# Patient Record
Sex: FEMALE | Race: WHITE | NOT HISPANIC OR LATINO | Employment: UNEMPLOYED | URBAN - METROPOLITAN AREA
[De-identification: names, ages, dates, MRNs, and addresses within clinical notes are randomized per-mention and may not be internally consistent; named-entity substitution may affect disease eponyms.]

---

## 2021-01-19 PROBLEM — R19.7 DIARRHEA: Status: ACTIVE | Noted: 2021-01-19

## 2021-02-10 PROBLEM — L40.0 PLAQUE PSORIASIS: Status: ACTIVE | Noted: 2021-02-10

## 2021-02-10 PROBLEM — E66.9 OBESITY: Status: ACTIVE | Noted: 2021-02-10

## 2021-02-18 ENCOUNTER — SPECIALTY PHARMACY (OUTPATIENT)
Dept: PHARMACY | Facility: CLINIC | Age: 46
End: 2021-02-18

## 2021-02-22 ENCOUNTER — SPECIALTY PHARMACY (OUTPATIENT)
Dept: PHARMACY | Facility: CLINIC | Age: 46
End: 2021-02-22

## 2021-02-22 PROBLEM — R19.7 DIARRHEA: Status: RESOLVED | Noted: 2021-01-19 | Resolved: 2021-02-22

## 2021-02-22 PROBLEM — L40.50 PSORIATIC ARTHRITIS: Status: ACTIVE | Noted: 2021-02-22

## 2021-02-22 PROBLEM — R03.0 ELEVATED BLOOD-PRESSURE READING WITHOUT DIAGNOSIS OF HYPERTENSION: Status: ACTIVE | Noted: 2021-02-22

## 2022-03-24 LAB — CRC RECOMMENDATION EXT: NORMAL

## 2022-05-16 ENCOUNTER — SPECIALTY PHARMACY (OUTPATIENT)
Dept: PHARMACY | Facility: CLINIC | Age: 47
End: 2022-05-16

## 2022-05-16 NOTE — TELEPHONE ENCOUNTER
Incoming call from pt inquiring about if [facility] received humira rx    Notified her that [facility] received. Pt stated that she last had it filled with [facility] but not sure if they would still be able to fill it. Notified her that [facility] can call to see which pharmacy can fill it for her    Will send message to rheum team for urgent work-up and follow-up

## 2022-05-16 NOTE — TELEPHONE ENCOUNTER
LVM for patient to inform her that [facility] can fill for her since she has Medicaid coverage.   PA required. Submitted via CMM   Loading: (Key: [id number])  Maintenance:  (Key: [id number])

## 2022-05-17 NOTE — TELEPHONE ENCOUNTER
DOCUMENTATION ONLY:  Prior authorization for Mena SUBRAMANIAN and MD approved from 5/16/22 to 11/16/22    Case Id:  [id number]    Co-pay: 0.00    NATALIE override needed. Rep- Vanna. NATALIE approved from 5/17/22-5/17/23.     Medicaid, FA not required.

## 2022-05-18 NOTE — TELEPHONE ENCOUNTER
Patient called back regarding restarting Humira. While attempting to set up a shipment, patient stated she is currently staying in [place] indefinitely. Advised patient [facility] is unable to ship to [place] at this time. Patient requested to fill with [facility]. Confirmed [facility] is on patient's preferred list. Messaged provider to send new Rxs to [facility]. Closing referral.

## 2022-07-21 ENCOUNTER — PATIENT OUTREACH (OUTPATIENT)
Dept: ADMINISTRATIVE | Facility: HOSPITAL | Age: 47
End: 2022-07-21

## 2022-12-15 ENCOUNTER — SPECIALTY PHARMACY (OUTPATIENT)
Dept: PHARMACY | Facility: CLINIC | Age: 47
End: 2022-12-15

## 2022-12-15 NOTE — TELEPHONE ENCOUNTER
Incoming call from the pt regarding her Humira. Pt said she was due to inject on 12/14, past due now. Informed her that the PA was submitted and pending a decision. Pt usually fills with [facility] but may want to fill with [facility] this time. Told pt I would let her assigned rp to call her ASAP with insurance decision.

## 2022-12-15 NOTE — TELEPHONE ENCOUNTER
Humira PA submitted via CMM. Key: [id number]  Suzie, this is Ross with [facility].  We are working on your prescription that your doctor has sent us. We will be working with your insurance to get this approved for you. We will be calling you along the way with updates on your medication.  If you have any questions, you can reach us at [phone].    Welcome call outcome: Left voicemail

## 2022-12-16 ENCOUNTER — SPECIALTY PHARMACY (OUTPATIENT)
Dept: PHARMACY | Facility: CLINIC | Age: 47
End: 2022-12-16

## 2022-12-16 DIAGNOSIS — L40.0 PLAQUE PSORIASIS: Primary | ICD-10-CM

## 2022-12-16 NOTE — TELEPHONE ENCOUNTER
Specialty Pharmacy - Initial Clinical Assessment    Specialty Medication Orders Linked to Encounter      Flowsheet Row Most Recent Value   Medication #1 adalimumab (HUMIRA,CF, PEN) 40 mg/0.4 mL PnKt (Order#[id number], Rx#[id number])          Patient Diagnosis   L40.0 - Plaque psoriasis    Jones Meza is a 47 y.o. female, who is followed by the specialty pharmacy service for management and education.    Recent Encounters       Date Type Provider Description    12/16/2022 Specialty Pharmacy Sandy Jackson Initial Clinical Assessment    12/15/2022 Specialty Pharmacy Sandy Jackson Referral Authorization    05/16/2022 Specialty Pharmacy Reyna Coon PharmD Referral Authorization    02/22/2021 Specialty Pharmacy Blanquita Quiñones PharmD Initial Clinical Assessment    02/18/2021 Specialty Pharmacy Dixie Dao PharmD Referral Authorization          Clinical call attempts since last clinical assessment   5/18/2022  4:37 PM - Specialty Pharmacy - Clinical Assessment by Shayy Nagy PharmD     Current Outpatient Medications   Medication Sig    adalimumab (HUMIRA,CF, PEN) 40 mg/0.4 mL PnKt Inject 0.4 mLs (40 mg total) into the skin every 14 (fourteen) days.    hydrOXYchloroQUINE (PLAQUENIL) 200 mg tablet Take 1 tablet (200 mg total) by mouth 2 (two) times daily.    triamcinolone acetonide 0.1% (KENALOG) 0.1 % ointment Apply a thin layer on the affected area as needed. 2 weeks on/2 weeks off   Last reviewed on 12/16/2022  1:31 PM by Manuel Narayan, Sandy    Review of patient's allergies indicates:  No Known AllergiesLast reviewed on  12/16/2022 1:31 PM by Manuel Narayan    Drug Interactions    Drug interactions evaluated: yes  Clinically relevant drug interactions identified: no  Provided the patient with educational material regarding drug interactions: not applicable           Assessment Questions - Documented Responses      Flowsheet Row Most Recent Value   Assessment    Medication  "Reconciliation completed for patient Yes   During the past 4 weeks, has patient missed any activities due to condition or medication? No   During the past 4 weeks, did patient have any of the following urgent care visits? None   Goals of Therapy Status Achieving   Status of the patients ability to self-administer: Is Able   All education points have been covered with patient? No, patient declined- printed education provided   Welcome packet contents reviewed and discussed with patient? Yes   Assesment completed? Yes   Plan Therapy continued   Do you need to open a clinical intervention (i-vent)? No   Do you want to schedule first shipment? Yes          Refill Questions - Documented Responses      Flowsheet Row Most Recent Value   Patient Availability and HIPAA Verification    Does patient want to proceed with activity? Yes   HIPAA/medical authority confirmed? Yes   Relationship to patient of person spoken to? Self   Refill Screening Questions    When does the patient need to receive the medication? 12/20/22   Refill Delivery Questions    How will the patient receive the medication? Mail   When does the patient need to receive the medication? 12/20/22   Shipping Address Home   Address in [facility] confirmed and updated if neccessary? Yes   Expected Copay ($) 0   Is the patient able to afford the medication copay? Yes   Payment Method zero copay   Days supply of Refill 28   Supplies needed? No supplies needed   Refill activity completed? Yes   Refill activity plan Refill scheduled   Shipment/Pickup Date: 12/19/22            Objective    She has a past medical history of Colon polyps, Polycystic ovary syndrome, and Psoriatic arthritis.        BP Readings from Last 4 Encounters:   05/11/22 135/88   04/20/22 129/79   03/24/21 (!) 138/101   02/10/21 (!) 147/79     Ht Readings from Last 4 Encounters:   05/11/22 5' 5" (1.651 m)   04/20/22 5' 5" (1.651 m)   03/24/21 5' 5" (1.651 m)   02/10/21 5' 5" (1.651 m)     Wt " Readings from Last 4 Encounters:   05/11/22 112.7 kg (248 lb 6.4 oz)   04/20/22 114.5 kg (252 lb 6.4 oz)   03/24/21 108.9 kg (240 lb)   02/10/21 108.7 kg (239 lb 11.2 oz)     No results for input(s): CREATININE, ALT, AST, HEPBSAG, HEPBSAB, HEPBCAB in the last 2160 hours.  The goals of Psoriasis treatment include:  Reducing the size, thickness and extent of lesions and erythema  Relieving the symptoms of psoriasis  Improving and maintaining physical function  Preventing infection and other complications of treatment  Reducing long term complications of psoriasis  Minimizing psychological impact on overall well-being  Improving or maintaining quality of life  Maintaining optimal therapy adherence  Minimizing and managing side effects    Goals of Therapy Status: Achieving    Assessment/Plan  Patient plans to continue therapy without changes      Indication, dosage, appropriateness, effectiveness, safety and convenience of her specialty medication(s) were reviewed today.     Patient Education   Pharmacist offer to  patient was declined. Printed educational materials will be provided with medication.  Patient did accept verbal education on the following topics:    Pt declined initial consult and clinical services. She has been on therapy for 6 years.     Tasks added this encounter   No tasks added.   Tasks due within next 3 months   12/16/2022 - Clinical - Initial Assessment     S[facility] - Specialty Pharmacy  [address]  [facility] [address]  Phone: [phone]  Fax: [phone]

## 2022-12-16 NOTE — TELEPHONE ENCOUNTER
Humira PA approved. Request Reference Number: [id number]. KAYLA PEN INJ 40/0.4ML is approved through 12/15/2023. For further questions, call [facility] at [phone].  Medicaid coverage. BI and FA not required.

## 2023-01-10 ENCOUNTER — SPECIALTY PHARMACY (OUTPATIENT)
Dept: PHARMACY | Facility: CLINIC | Age: 48
End: 2023-01-10

## 2023-01-10 NOTE — TELEPHONE ENCOUNTER
Incoming call from pt regarding Humira.  Pt gave new insurance information.  Insurance entered to into [facility].  Claim rejecting for PA required.  Informed pt will notify assigned pharmacist to work on urgent PA.  Pt voiced understanding.  Pt stated she is due to inject on 1/17.  Routing assigned pharmacist to work on PA.

## 2023-01-10 NOTE — TELEPHONE ENCOUNTER
Outgoing call: patient is due to inject on 1/17, insurance is rejecting saying not covered. Patient will follow up with [facility] once she speak with insurance. Will follow up.

## 2023-01-13 NOTE — TELEPHONE ENCOUNTER
Informed patient to inject when arrives and return to normal calendar, patient expressed understanding.

## 2023-01-13 NOTE — TELEPHONE ENCOUNTER
Specialty Pharmacy - Refill Coordination    Specialty Medication Orders Linked to Encounter      Flowsheet Row Most Recent Value   Medication #1 adalimumab (HUMIRA,CF, PEN) 40 mg/0.4 mL PnKt (Order#[id number], Rx#[id number])            Refill Questions - Documented Responses      Flowsheet Row Most Recent Value   Patient Availability and HIPAA Verification    Does patient want to proceed with activity? Yes   HIPAA/medical authority confirmed? Yes   Relationship to patient of person spoken to? Self   Refill Screening Questions    Changes to allergies? No   Changes to medications? No   New conditions since last clinic visit? No   Unplanned office visit, urgent care, ED, or hospital admission in the last 4 weeks? No   How does patient/caregiver feel medication is working? Good   Financial problems or insurance changes? No   How many doses of your specialty medications were missed in the last 4 weeks? 0   Would patient like to speak to a pharmacist? No   When does the patient need to receive the medication? 01/17/23   Refill Delivery Questions    How will the patient receive the medication? [facility]   When does the patient need to receive the medication? 01/17/23   Shipping Address Home   Address in [facility] confirmed and updated if neccessary? Yes   Expected Copay ($) 0   Is the patient able to afford the medication copay? Yes   Payment Method zero copay   Days supply of Refill 28   Supplies needed? No supplies needed   Refill activity completed? Yes   Refill activity plan Refill scheduled   Shipment/Pickup Date: 01/17/23            Current Outpatient Medications   Medication Sig    adalimumab (HUMIRA,CF, PEN) 40 mg/0.4 mL PnKt Inject 0.4 mLs (40 mg total) into the skin every 14 (fourteen) days.    hydrOXYchloroQUINE (PLAQUENIL) 200 mg tablet Take 1 tablet (200 mg total) by mouth 2 (two) times daily. (Patient not taking: Reported on 1/10/2023)    ketoconazole (NIZORAL) 2 % shampoo Apply topically twice a week.     triamcinolone acetonide 0.1% (KENALOG) 0.1 % ointment Apply a thin layer on the affected area as needed. 2 weeks on/2 weeks off   Last reviewed on 1/11/2023  3:14 PM by Kathy Hilario MD    Review of patient's allergies indicates:  No Known Allergies Last reviewed on  1/11/2023 3:15 PM by Kathy Hilario      Tasks added this encounter   2/7/2023 - Refill Call (Auto Added)   Tasks due within next 3 months   No tasks due.     Carrie Mahoney [facility] - Specialty Pharmacy  [address] [facility] [address]  Phone: [phone]  Fax: [phone]

## 2023-02-01 DIAGNOSIS — Z12.31 OTHER SCREENING MAMMOGRAM: ICD-10-CM

## 2023-02-03 ENCOUNTER — PATIENT MESSAGE (OUTPATIENT)
Dept: ADMINISTRATIVE | Facility: HOSPITAL | Age: 48
End: 2023-02-03

## 2023-02-07 ENCOUNTER — SPECIALTY PHARMACY (OUTPATIENT)
Dept: PHARMACY | Facility: CLINIC | Age: 48
End: 2023-02-07

## 2023-02-24 ENCOUNTER — PATIENT MESSAGE (OUTPATIENT)
Dept: RESEARCH | Facility: HOSPITAL | Age: 48
End: 2023-02-24

## 2023-03-07 ENCOUNTER — SPECIALTY PHARMACY (OUTPATIENT)
Dept: PHARMACY | Facility: CLINIC | Age: 48
End: 2023-03-07

## 2023-03-07 NOTE — TELEPHONE ENCOUNTER
Specialty Pharmacy - Refill Coordination    Specialty Medication Orders Linked to Encounter      Flowsheet Row Most Recent Value   Medication #1 adalimumab (HUMIRA,CF, PEN) 40 mg/0.4 mL PnKt (Order#[id number], Rx#[id number])            Refill Questions - Documented Responses      Flowsheet Row Most Recent Value   Patient Availability and HIPAA Verification    Does patient want to proceed with activity? Yes   HIPAA/medical authority confirmed? Yes   Relationship to patient of person spoken to? Self   Refill Screening Questions    Changes to allergies? No   Changes to medications? No   New conditions since last clinic visit? No   Unplanned office visit, urgent care, ED, or hospital admission in the last 4 weeks? No   How does patient/caregiver feel medication is working? Very good   Financial problems or insurance changes? No   How many doses of your specialty medications were missed in the last 4 weeks? 0   Would patient like to speak to a pharmacist? No   When does the patient need to receive the medication? 03/14/23   Refill Delivery Questions    How will the patient receive the medication? Mail   When does the patient need to receive the medication? 03/14/23   Shipping Address Home   Address in [facility] confirmed and updated if neccessary? Yes   Expected Copay ($) 0   Is the patient able to afford the medication copay? Yes   Payment Method zero copay   Days supply of Refill 28   Supplies needed? No supplies needed   Refill activity completed? Yes   Refill activity plan Refill scheduled   Shipment/Pickup Date: 03/09/23            Current Outpatient Medications   Medication Sig    adalimumab (HUMIRA,CF, PEN) 40 mg/0.4 mL PnKt Inject 0.4 mLs (40 mg total) into the skin every 14 (fourteen) days.    hydrOXYchloroQUINE (PLAQUENIL) 200 mg tablet Take 1 tablet (200 mg total) by mouth 2 (two) times daily. (Patient not taking: Reported on 1/10/2023)    ketoconazole (NIZORAL) 2 % shampoo Apply topically twice a  week.    triamcinolone acetonide 0.1% (KENALOG) 0.1 % ointment Apply a thin layer on the affected area as needed. 2 weeks on/2 weeks off   Last reviewed on 1/11/2023  3:14 PM by Kathy Hilario MD    Review of patient's allergies indicates:  No Known Allergies Last reviewed on  1/11/2023 3:15 PM by Kathy Hilario      Tasks added this encounter   4/4/2023 - Refill Call (Auto Added)   Tasks due within next 3 months   No tasks due.     Jaymie Downey  [facility] - Specialty Pharmacy  [address] [place] [address]  Phone: [phone]  Fax: [phone]

## 2023-04-04 ENCOUNTER — SPECIALTY PHARMACY (OUTPATIENT)
Dept: PHARMACY | Facility: CLINIC | Age: 48
End: 2023-04-04

## 2023-04-04 NOTE — TELEPHONE ENCOUNTER
Specialty Pharmacy - Refill Coordination    Specialty Medication Orders Linked to Encounter      Flowsheet Row Most Recent Value   Medication #1 adalimumab (HUMIRA,CF, PEN) 40 mg/0.4 mL PnKt (Order#[id number], Rx#[id number])            Refill Questions - Documented Responses      Flowsheet Row Most Recent Value   Patient Availability and HIPAA Verification    Does patient want to proceed with activity? Yes   HIPAA/medical authority confirmed? Yes   Relationship to patient of person spoken to? Self   Refill Screening Questions    Changes to allergies? No   Changes to medications? No   New conditions since last clinic visit? No   Unplanned office visit, urgent care, ED, or hospital admission in the last 4 weeks? No   How does patient/caregiver feel medication is working? Good   Financial problems or insurance changes? No   How many doses of your specialty medications were missed in the last 4 weeks? 0   Would patient like to speak to a pharmacist? No   When does the patient need to receive the medication? 04/11/23   Refill Delivery Questions    How will the patient receive the medication? Mail   When does the patient need to receive the medication? 04/11/23   Shipping Address Home   Address in [facility] confirmed and updated if neccessary? Yes   Expected Copay ($) 0   Is the patient able to afford the medication copay? Yes   Payment Method zero copay   Days supply of Refill 28   Supplies needed? No supplies needed   Refill activity completed? Yes   Refill activity plan Refill scheduled   Shipment/Pickup Date: 04/06/23            Current Outpatient Medications   Medication Sig    adalimumab (HUMIRA,CF, PEN) 40 mg/0.4 mL PnKt Inject 0.4 mLs (40 mg total) into the skin every 14 (fourteen) days.    hydrOXYchloroQUINE (PLAQUENIL) 200 mg tablet Take 1 tablet (200 mg total) by mouth 2 (two) times daily. (Patient not taking: Reported on 1/10/2023)    ketoconazole (NIZORAL) 2 % shampoo Apply topically twice a week.     triamcinolone acetonide 0.1% (KENALOG) 0.1 % ointment Apply a thin layer on the affected area as needed. 2 weeks on/2 weeks off   Last reviewed on 1/11/2023  3:14 PM by Kathy Hilario MD    Review of patient's allergies indicates:  No Known Allergies Last reviewed on  1/11/2023 3:15 PM by Kathy Hilario      Tasks added this encounter   5/2/2023 - Refill Call (Auto Added)   Tasks due within next 3 months   No tasks due.     Miryam Mahoney [facility] - Specialty Pharmacy  [address] [facility] [address]  Phone: [phone]  Fax: [phone]

## 2023-05-02 ENCOUNTER — SPECIALTY PHARMACY (OUTPATIENT)
Dept: PHARMACY | Facility: CLINIC | Age: 48
End: 2023-05-02

## 2023-05-02 NOTE — TELEPHONE ENCOUNTER
Specialty Pharmacy - Refill Coordination    Specialty Medication Orders Linked to Encounter      Flowsheet Row Most Recent Value   Medication #1 adalimumab (HUMIRA,CF, PEN) 40 mg/0.4 mL PnKt (Order#[id number], Rx#[id number])            Refill Questions - Documented Responses      Flowsheet Row Most Recent Value   Patient Availability and HIPAA Verification    Does patient want to proceed with activity? Yes   HIPAA/medical authority confirmed? Yes   Relationship to patient of person spoken to? Self   Refill Screening Questions    Changes to allergies? No   Changes to medications? No   New conditions since last clinic visit? No   Unplanned office visit, urgent care, ED, or hospital admission in the last 4 weeks? No   How does patient/caregiver feel medication is working? Very good   Financial problems or insurance changes? No   How many doses of your specialty medications were missed in the last 4 weeks? 0   Would patient like to speak to a pharmacist? No   When does the patient need to receive the medication? 05/09/23   Refill Delivery Questions    How will the patient receive the medication? Mail   When does the patient need to receive the medication? 05/09/23   Shipping Address Home   Address in [facility] confirmed and updated if neccessary? Yes   Expected Copay ($) 0   Is the patient able to afford the medication copay? Yes   Payment Method zero copay   Days supply of Refill 28   Supplies needed? No supplies needed   Refill activity completed? Yes   Refill activity plan Refill scheduled   Shipment/Pickup Date: 05/04/23            Current Outpatient Medications   Medication Sig    adalimumab (HUMIRA,CF, PEN) 40 mg/0.4 mL PnKt Inject 0.4 mLs (40 mg total) into the skin every 14 (fourteen) days.    hydrOXYchloroQUINE (PLAQUENIL) 200 mg tablet Take 1 tablet (200 mg total) by mouth 2 (two) times daily. (Patient not taking: Reported on 1/10/2023)    ketoconazole (NIZORAL) 2 % shampoo Apply topically twice a  week.    triamcinolone acetonide 0.1% (KENALOG) 0.1 % ointment Apply a thin layer on the affected area as needed. 2 weeks on/2 weeks off   Last reviewed on 1/11/2023  3:14 PM by Kathy Hilario MD    Review of patient's allergies indicates:  No Known Allergies Last reviewed on  1/11/2023 3:15 PM by Kathy Hilario      Tasks added this encounter   No tasks added.   Tasks due within next 3 months   5/2/2023 - Refill Coordination Outreach (1 time occurrence)     Jaymie Mahoney [facility] - Specialty Pharmacy  [address] [place] [address]  Phone: [phone]  Fax: [phone]

## 2023-05-25 ENCOUNTER — SPECIALTY PHARMACY (OUTPATIENT)
Dept: PHARMACY | Facility: CLINIC | Age: 48
End: 2023-05-25

## 2023-05-25 NOTE — TELEPHONE ENCOUNTER
Outgoing call regarding humira refill; per pt, she's due to inject on 6/6; informed her that [facility] will follow up on 5/29 to schedule delivery

## 2023-05-29 NOTE — TELEPHONE ENCOUNTER
Specialty Pharmacy - Refill Coordination    Specialty Medication Orders Linked to Encounter      Flowsheet Row Most Recent Value   Medication #1 adalimumab (HUMIRA,CF, PEN) 40 mg/0.4 mL PnKt (Order#[id number], Rx#[id number])          Refill Questions - Documented Responses      Flowsheet Row Most Recent Value   Patient Availability and HIPAA Verification    Does patient want to proceed with activity? Yes   HIPAA/medical authority confirmed? Yes   Relationship to patient of person spoken to? Self   Refill Screening Questions    Changes to allergies? No   Changes to medications? No   New conditions since last clinic visit? No   Unplanned office visit, urgent care, ED, or hospital admission in the last 4 weeks? No   How does patient/caregiver feel medication is working? Good   Financial problems or insurance changes? No   How many doses of your specialty medications were missed in the last 4 weeks? 0   Would patient like to speak to a pharmacist? No   When does the patient need to receive the medication? 06/06/23   Refill Delivery Questions    How will the patient receive the medication? Mail   When does the patient need to receive the medication? 06/06/23   Shipping Address Home   Address in [facility] confirmed and updated if neccessary? Yes   Expected Copay ($) 0   Is the patient able to afford the medication copay? Yes   Payment Method zero copay   Days supply of Refill 28   Supplies needed? No supplies needed   Refill activity completed? Yes   Refill activity plan Refill scheduled   Shipment/Pickup Date: 05/31/23            Current Outpatient Medications   Medication Sig    adalimumab (HUMIRA,CF, PEN) 40 mg/0.4 mL PnKt Inject 0.4 mLs (40 mg total) into the skin every 14 (fourteen) days.    hydrOXYchloroQUINE (PLAQUENIL) 200 mg tablet Take 1 tablet (200 mg total) by mouth 2 (two) times daily. (Patient not taking: Reported on 1/10/2023)    ketoconazole (NIZORAL) 2 % shampoo Apply topically twice a week.     triamcinolone acetonide 0.1% (KENALOG) 0.1 % ointment Apply a thin layer on the affected area as needed. 2 weeks on/2 weeks off   Last reviewed on 1/11/2023  3:14 PM by Kathy Hilario MD    Review of patient's allergies indicates:  No Known Allergies Last reviewed on  1/11/2023 3:15 PM by Kathy Hilario      Tasks added this encounter   No tasks added.   Tasks due within next 3 months   6/1/2023 - Refill Coordination Outreach (1 time occurrence)     Be Donald, PharmD  [facility]  [address] [place] [address]  Phone: [phone]  Fax: [phone]

## 2023-06-21 ENCOUNTER — PATIENT MESSAGE (OUTPATIENT)
Dept: PHARMACY | Facility: CLINIC | Age: 48
End: 2023-06-21

## 2023-06-27 ENCOUNTER — SPECIALTY PHARMACY (OUTPATIENT)
Dept: PHARMACY | Facility: CLINIC | Age: 48
End: 2023-06-27

## 2023-06-27 NOTE — TELEPHONE ENCOUNTER
Specialty Pharmacy - Refill Coordination    Specialty Medication Orders Linked to Encounter      Flowsheet Row Most Recent Value   Medication #1 adalimumab (HUMIRA,CF, PEN) 40 mg/0.4 mL PnKt (Order#[id number], Rx#[id number])            Refill Questions - Documented Responses      Flowsheet Row Most Recent Value   Patient Availability and HIPAA Verification    Does patient want to proceed with activity? Yes   HIPAA/medical authority confirmed? Yes   Relationship to patient of person spoken to? Self   Refill Screening Questions    Changes to allergies? No   Changes to medications? No   New conditions since last clinic visit? No   Unplanned office visit, urgent care, ED, or hospital admission in the last 4 weeks? No   How does patient/caregiver feel medication is working? Good   Financial problems or insurance changes? No   How many doses of your specialty medications were missed in the last 4 weeks? 0   Would patient like to speak to a pharmacist? No   When does the patient need to receive the medication? 07/04/23   Refill Delivery Questions    How will the patient receive the medication? Mail   When does the patient need to receive the medication? 07/04/23   Shipping Address Home   Address in [facility] confirmed and updated if neccessary? Yes   Expected Copay ($) 0   Is the patient able to afford the medication copay? Yes   Payment Method zero copay   Days supply of Refill 28   Supplies needed? No supplies needed   Refill activity completed? Yes   Refill activity plan Refill scheduled   Shipment/Pickup Date: 06/29/23            Current Outpatient Medications   Medication Sig    adalimumab (HUMIRA,CF, PEN) 40 mg/0.4 mL PnKt Inject 0.4 mLs (40 mg total) into the skin every 14 (fourteen) days.    hydrOXYchloroQUINE (PLAQUENIL) 200 mg tablet Take 1 tablet (200 mg total) by mouth 2 (two) times daily. (Patient not taking: Reported on 1/10/2023)    ketoconazole (NIZORAL) 2 % shampoo Apply topically twice a week.     triamcinolone acetonide 0.1% (KENALOG) 0.1 % ointment Apply a thin layer on the affected area as needed. 2 weeks on/2 weeks off   Last reviewed on 1/11/2023  3:14 PM by Kathy Hilario MD    Review of patient's allergies indicates:  No Known Allergies Last reviewed on  1/11/2023 3:15 PM by Kathy Hilario      Tasks added this encounter   No tasks added.   Tasks due within next 3 months   No tasks due.     Paula Dao, PharmD  [facility]  [address] [place] [id number]  Phone: [phone]  Fax: [phone]

## 2023-07-20 ENCOUNTER — SPECIALTY PHARMACY (OUTPATIENT)
Dept: PHARMACY | Facility: CLINIC | Age: 48
End: 2023-07-20

## 2023-07-20 DIAGNOSIS — L40.0 PLAQUE PSORIASIS: Primary | ICD-10-CM

## 2023-07-20 NOTE — TELEPHONE ENCOUNTER
Outgoing call to pt regarding Humira refill. PA is required. Pt is due 8/1. No missed/late doses. Routing to [facility].

## 2023-07-21 NOTE — TELEPHONE ENCOUNTER
Specialty Pharmacy - Refill Coordination    Specialty Medication Orders Linked to Encounter      Flowsheet Row Most Recent Value   Medication #1 adalimumab (HUMIRA,CF, PEN) 40 mg/0.4 mL PnKt (Order#[id number], Rx#[id number])            Refill Questions - Documented Responses      Flowsheet Row Most Recent Value   Patient Availability and HIPAA Verification    Does patient want to proceed with activity? Yes   HIPAA/medical authority confirmed? Yes   Relationship to patient of person spoken to? Self   Refill Screening Questions    Changes to allergies? No   Changes to medications? No   New conditions since last clinic visit? No   Unplanned office visit, urgent care, ED, or hospital admission in the last 4 weeks? No   How does patient/caregiver feel medication is working? Excellent   Financial problems or insurance changes? No   How many doses of your specialty medications were missed in the last 4 weeks? 0   Would patient like to speak to a pharmacist? No   When does the patient need to receive the medication? 08/01/23   Refill Delivery Questions    How will the patient receive the medication? Mail   When does the patient need to receive the medication? 08/01/23   Shipping Address Home   Address in [facility] confirmed and updated if neccessary? Yes   Expected Copay ($) 0   Is the patient able to afford the medication copay? Yes   Payment Method zero copay   Days supply of Refill 28   Supplies needed? No supplies needed   Refill activity completed? Yes   Refill activity plan Refill scheduled   Shipment/Pickup Date: 07/27/23            Current Outpatient Medications   Medication Sig    adalimumab (HUMIRA,CF, PEN) 40 mg/0.4 mL PnKt Inject 0.4 mLs (40 mg total) into the skin every 14 (fourteen) days.    hydrOXYchloroQUINE (PLAQUENIL) 200 mg tablet Take 1 tablet (200 mg total) by mouth 2 (two) times daily. (Patient not taking: Reported on 1/10/2023)    ketoconazole (NIZORAL) 2 % shampoo Apply topically twice a  week.    triamcinolone acetonide 0.1% (KENALOG) 0.1 % ointment Apply a thin layer on the affected area as needed. 2 weeks on/2 weeks off   Last reviewed on 1/11/2023  3:14 PM by Kathy Hilario MD    Review of patient's allergies indicates:  No Known Allergies Last reviewed on  1/11/2023 3:15 PM by Kathy Hilario      Tasks added this encounter   No tasks added.   Tasks due within next 3 months   7/21/2023 - Refill Coordination Outreach (1 time occurrence)  7/24/2023 - Benefits Investigation     Manuel Narayan, PharmD  [facility]  [address] [facility] [address]  Phone: [phone]  Fax: [phone]

## 2023-08-17 ENCOUNTER — SPECIALTY PHARMACY (OUTPATIENT)
Dept: PHARMACY | Facility: CLINIC | Age: 48
End: 2023-08-17

## 2023-08-17 NOTE — TELEPHONE ENCOUNTER
Patient has inection for 8/22, will need for 9/5, informed that insurance is rejecting coverage terminted, no active coverage found through Eligibility check. Patient says she will call insurance.

## 2023-08-22 NOTE — TELEPHONE ENCOUNTER
Outgoing call regarding humira refill; per pt, she's going to see if she can find the email about her insurance coverage and call us back with what it says; due to inject on 9/5; call pended to 8/25 for follow up

## 2023-10-21 ENCOUNTER — PATIENT MESSAGE (OUTPATIENT)
Dept: ADMINISTRATIVE | Facility: OTHER | Age: 48
End: 2023-10-21

## 2024-02-07 PROBLEM — E87.1 HYPONATREMIA: Status: ACTIVE | Noted: 2024-02-07

## 2024-02-07 PROBLEM — D72.9 NEUTROPHILIC LEUKOCYTOSIS: Status: ACTIVE | Noted: 2024-02-07

## 2024-02-07 PROBLEM — R06.02 SHORTNESS OF BREATH: Status: ACTIVE | Noted: 2024-02-07

## 2024-02-07 PROBLEM — E66.01 CLASS 3 SEVERE OBESITY WITH SERIOUS COMORBIDITY AND BODY MASS INDEX (BMI) OF 40.0 TO 44.9 IN ADULT: Status: ACTIVE | Noted: 2021-02-10

## 2024-02-07 PROBLEM — L03.119 CELLULITIS OF LEG: Status: ACTIVE | Noted: 2024-02-07

## 2024-02-07 PROBLEM — B35.6 TINEA CRURIS: Status: ACTIVE | Noted: 2024-02-07

## 2024-03-11 ENCOUNTER — PATIENT MESSAGE (OUTPATIENT)
Dept: ADMINISTRATIVE | Facility: OTHER | Age: 49
End: 2024-03-11